# Patient Record
Sex: MALE | Race: WHITE
[De-identification: names, ages, dates, MRNs, and addresses within clinical notes are randomized per-mention and may not be internally consistent; named-entity substitution may affect disease eponyms.]

---

## 2019-12-05 ENCOUNTER — HOSPITAL ENCOUNTER (EMERGENCY)
Dept: HOSPITAL 5 - ED | Age: 79
LOS: 1 days | Discharge: OTHER INSTITUTION | End: 2019-12-06
Payer: MEDICARE

## 2019-12-05 VITALS — DIASTOLIC BLOOD PRESSURE: 60 MMHG | SYSTOLIC BLOOD PRESSURE: 140 MMHG

## 2019-12-05 DIAGNOSIS — S06.5X9A: Primary | ICD-10-CM

## 2019-12-05 DIAGNOSIS — S00.03XA: ICD-10-CM
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
## 2019-12-05 DIAGNOSIS — S00.83XA: ICD-10-CM
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
## 2019-12-05 DIAGNOSIS — S20.219A: ICD-10-CM
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
## 2019-12-05 DIAGNOSIS — S10.93XA: ICD-10-CM

## 2019-12-05 DIAGNOSIS — I10: ICD-10-CM
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
## 2019-12-05 DIAGNOSIS — M62.838: ICD-10-CM

## 2019-12-05 DIAGNOSIS — Z87.891: ICD-10-CM

## 2019-12-05 PROCEDURE — 72125 CT NECK SPINE W/O DYE: CPT

## 2019-12-05 PROCEDURE — 70450 CT HEAD/BRAIN W/O DYE: CPT

## 2019-12-05 PROCEDURE — 70486 CT MAXILLOFACIAL W/O DYE: CPT

## 2019-12-05 PROCEDURE — 71250 CT THORAX DX C-: CPT

## 2019-12-05 NOTE — CAT SCAN REPORT
CT facial bones wo con



INDICATION / CLINICAL INFORMATION:

78 years Male; fall with pain.



TECHNIQUE:

Thin cut axial images obtained to the facial bones. Sagittal and coronal reconstructions performed. A
ll CT scans at this location are performed using CT dose reduction for ALARA by means of automated ex
posure control. 



COMPARISON:

None available.



FINDINGS:

Significant subcutaneous soft tissue swelling is seen in the left facial region. No signs of underlyi
ng facial fracture appreciated.



Mild mucosal thickening seen in the ethmoids.



Poor dentition noted.



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



There is slight anterior subluxation of C1 with respect to C2, although the atlantodens interval is w
ithin normal limits. This finding is most likely on a developmental basis. There is mild narrowing of
 the thecal sac in this region, although no cervical medullary junction compression appreciated.



IMPRESSION:

1. No signs of acute bony facial trauma. 



Signer Name: Silvio Barnes MD, III 

Signed: 12/5/2019 8:39 PM

 Workstation Name: [id number]

## 2019-12-05 NOTE — CAT SCAN REPORT
CT head/brain wo con



INDICATION / CLINICAL INFORMATION:

78 years Male; fall with pain.



TECHNIQUE: Routine CT head without contrast. All CT scans at this location are performed using CT dos
e reduction for ALARA by means of automated exposure control.



COMPARISON: 

None.



FINDINGS:



BRAIN / INTRACRANIAL CONTENTS: There are findings concerning for a very small subdural hematoma along
 the left tentorium cerebelli. Short-term follow-up is recommended to ensure stability and/or resolut
ion of these findings.



Arachnoid cyst seen in the posterior fossa along the posterolateral margin of the right cerebellar he
misphere.



 Otherwise, no acute hemorrhage, mass effect, midline shift, hydrocephalus, or acute, large territori
al infarct.



Mild cerebral and cerebellar atrophy. 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
e.



There are mild areas of decreased attenuation in the white matter of the cerebral hemispheres. These 
are nonspecific findings and may be related to microangiopathy (hypertension, diabetes, atheroscleros
is), given the patient's age. It might be difficult to evaluate for small areas of ischemia without d
iffusion imaging by MRI.



CRANIOCERVICAL JUNCTION: No significant abnormality.



ORBITS: No significant abnormality of visualized orbits.

SINUSES / MASTOIDS: No significant abnormality the visualized paranasal sinuses or mastoid air cells.




ADDITIONAL FINDINGS: There may be slight anterior subluxation of C1 with respect to C2 as evidenced b
y the spinolaminar line. Follow-up with CT of the cervical spine, as clinically warranted.



Subcutaneous soft tissue swelling seen in the left periorbital region. No signs of underlying fractur
e appreciated.



  Atherosclerotic disease is seen in the anterior and posterior circulation. 



IMPRESSION:

1. There is suggestion of a very thin, acute, subdural hematoma along the left tentorium cerebelli. S
hort-term follow-up is recommended to ensure stability and/or resolution of these findings.

2. Otherwise, no focal intra-axial mass, acute hemorrhage, hydrocephalus, or large infarct seen. 



Signer Name: Silvio Barnes MD, III 

Signed: 12/5/2019 8:20 PM

 Workstation Name: [id number]

## 2019-12-05 NOTE — EMERGENCY DEPARTMENT REPORT
Blank Doc





- Documentation


Documentation: 





78-year-0ld male that presents with fall.  Headache, neck pain, and eye hematoma

noted





This initial assessment/diagnostic orders/clinical plan/treatment(s) is/are 

subject to change based on patient's health status, clinical progression and re-

assessment by fellow clinical providers in the ED.  Further treatment and workup

at subsequent clinical providers discretion.  Patient/guardians urged not to 

elope from the ED as their condition may be serious if not clinically assessed 

and managed.  Initial orders include:


1- Patient sent to MAIN ED for further evaluation and treatment


2- CT head/face/neck


3- cervical collar

## 2019-12-05 NOTE — CAT SCAN REPORT
CT cervical spine wo con



INDICATION / CLINICAL INFORMATION:

78 years Male; fall with pain.



TECHNIQUE:

Axial CT images of the cervical spine were obtained.  Sagittal and coronal reformatted images were pr
oduced. All CT scans at this location are performed using CT dose reduction for ALARA by means of aut
omated exposure control.



COMPARISON: 

None available.



FINDINGS:



POST-SURGICAL CHANGES: None.



ALIGNMENT: There is slight anterior subluxation of C1 with respect to C2, with respect to the spinola
minar line. However, the space between the anterior arch of C1 and the adjacent dens is within normal
 limits. This finding is presumably on a developmental basis.



Mild kyphosis of the cervical spine seen, which may be related to patient positioning on the table.



VERTEBRAE: No signs of fracture. Vertebral bodies are grossly normal in height throughout.  



There is osseous foraminal narrowing at virtually all levels related uncinate and facet hypertrophy-m
oderate in degree.



INTRAVERTEBRAL DISCS: Multilevel disc space narrowing seen. Mild disc disease seen at various levels.
 No significant canal stenosis appreciated.



PARASPINAL SOFT TISSUES: No significant abnormality.



ADDITIONAL FINDINGS: There is mild mucosal thickening in the mastoids bilaterally.



IMPRESSION:

1. No signs of acute bony trauma to the cervical spine.



Signer Name: Silvio Barnes MD, III 

Signed: 12/5/2019 8:43 PM

 Workstation Name: [id number]

## 2019-12-05 NOTE — EMERGENCY DEPARTMENT REPORT
ED Fall HPI





- General


Chief Complaint: Fall


Stated Complaint: CHEST PAIN/FALL INJURY/FACE SWELL


Time Seen by Provider: 19 18:56


Source: patient, family


Mode of arrival: Ambulatory





- History of Present Illness


Initial Comments: 





Perform ED, patient is a 78-year-old  American male with a history of 

hypertension and hyperlipidemia who presents to the ED with complaint of left 

facial swelling, headache and chest wall pain after he rolled off the bed and 

fell on his face about 2 hours ago.  Family states that the patient landed on 

his face and now presents to the ED with a swollen severely painful left facial 

area.  Family also states that the patient did not lose consciousness, has not 

had any nausea, vomiting, shortness of breath, dizziness, lightheadedness, mcqueen

ge in vision, change in speech, back pain or numbness and tingling or weakness 

of upper and lower extremities bilaterally, seizures or confusion.


MD Complaint: fall, other (facial swelling; headache, chest wall  pain)


-: Sudden, hour(s) (2)


Fall From: out of bed


When Fall Occurred: 1-3 hours PTA


Fall Witnessed: yes, by family


Place Fall Occurred: home


Loss of Consciousness: second(s)


Prolonged Down Time?: no


Symptoms Prior to Fall: none


Location: head, face, chest


Severity: severe


Severity scale (0 -10): 7


Quality: sharp, aching


Context: other (Patient was asleep on bed when he rolled off the bed and landed 

on face)


Associated Symptoms: denies, headache, neck pain, chest paint.  denies: 

numbness, weakness, shortness of breath, abdominal pain, hematuria, unable to 

walk, lightheaded, vertigo, confusion, other





- Related Data


                                    Allergies











Allergy/AdvReac Type Severity Reaction Status Date / Time


 


No Known Allergies Allergy   Unverified 19 17:53














ED Review of Systems


ROS: 


Stated complaint: CHEST PAIN/FALL INJURY/FACE SWELL


Other details as noted in HPI





Constitutional: denies: chills, fever


Eyes: denies: eye pain, eye discharge, vision change


ENT: other (left facial swelling and pain).  denies: ear pain, throat pain


Respiratory: denies: cough, shortness of breath, SOB with exertion, SOB at rest,

wheezing


Cardiovascular: chest pain (diffuse chest wall pain).  denies: palpitations, 

syncope, paroxysmal nocturnal dyspnea


Endocrine: no symptoms reported


Gastrointestinal: denies: abdominal pain, nausea, vomiting, diarrhea


Genitourinary: denies: urgency, dysuria


Musculoskeletal: denies: back pain, joint swelling, arthralgia


Skin: denies: rash, lesions


Neurological: headache.  denies: weakness, numbness, paresthesias, abnormal 

gait, vertigo


Psychiatric: denies: anxiety, depression


Hematological/Lymphatic: denies: easy bleeding, easy bruising





ED Past Medical Hx





- Past Medical History


Previous Medical History?: Yes


Hx Hypertension: Yes





- Surgical History


Past Surgical History?: No





- Social History


Smoking Status: Former Smoker


Substance Use Type: None





ED Physical Exam





- General


Limitations: Language Barrier


General appearance: alert, in no apparent distress





- Head


Head exam: Present: other (swollen, severely tender left zygomatic area with 

large hematoma)





- Eye


Eye exam: Present: normal appearance, PERRL, EOMI, periorbital swelling (left), 

periorbital tenderness (left)


Pupils: Present: normal accommodation





- ENT


ENT exam: Present: normal exam, normal orophraynx, mucous membranes moist, TM's 

normal bilaterally, normal external ear exam, other (swollen, severely tender 

left zygomatic area)





- Neck


Neck exam: Present: normal inspection, tenderness (palpable cervical paraspinal 

musculoskeletal tenderness), full ROM





- Respiratory


Respiratory exam: Present: normal lung sounds bilaterally, chest wall tenderness

(palpable chest wall tenderness).  Absent: respiratory distress, wheezes, rales,

accessory muscle use





- Cardiovascular


Cardiovascular Exam: Present: regular rate, normal rhythm, normal heart sounds. 

Absent: systolic murmur, diastolic murmur, rubs, gallop





- GI/Abdominal


GI/Abdominal exam: Present: soft, normal bowel sounds.  Absent: tenderness, 

guarding, hyperactive bowel sounds, hypoactive bowel sounds, organomegaly





- Extremities Exam


Extremities exam: Present: normal inspection, full ROM, normal capillary refill





- Back Exam


Back exam: Present: normal inspection, full ROM.  Absent: tenderness, CVA 

tenderness (R), CVA tenderness (L), muscle spasm, paraspinal tenderness





- Neurological Exam


Neurological exam: Present: alert, oriented X3, CN II-XII intact, normal gait, 

reflexes normal





- Psychiatric


Psychiatric exam: Present: normal affect, normal mood





- Skin


Skin exam: Present: warm, dry, intact, normal color.  Absent: rash





ED Course





                                   Vital Signs











  19





  17:57 20:21 21:28


 


Temperature 98.3 F  99.0 F


 


Pulse Rate 87  76


 


Respiratory 16 18 18





Rate   


 


Blood Pressure 165/72  


 


Blood Pressure   140/60





[Left]   


 


O2 Sat by Pulse 92  96





Oximetry   














- Reevaluation(s)


Reevaluation #1: 





19 2045:02


Patient was treated for pain in the ED, head CT scan without contrast, C-spine 

CT scan without contrast, facial bone CT scan without contrast and chest CT scan

 without contrast were all ordered.





ED Medical Decision Making





- Radiology Data


Radiology results: report reviewed, image reviewed








Findings


[facility]


[address] [place] [address]





Cat Scan Report


Signed





Patient: TANNER SHEEHAN MR#: [id number]





: 1940 Acct:[id number]





Age/Sex: 78 / M ADM Date: 19





Loc: ED


Attending Dr:








Ordering Physician: ZOE DESAI


Date of Service: 19


Procedure(s): CT chest wo con


Accession Number(s): [id number]





cc: ZOE DESAI








CT chest wo con





INDICATION:


Fall - pain.





TECHNIQUE:


All CT scans at this location are performed using the following dose modulation 

technique:


Automated exposure control. Helical slices were obtained through the chest. No 

contrast is


administered.





COMPARISON:


None available.





FINDINGS:


Chest: There is no pneumothorax. There is some minimal linear scar or 

atelectatic change in the


left upper lobe adjacent to the major fissure. The heart size is normal. The 

aorta is normal in


diameter. No mediastinal, seen.





No acute abnormality is seen in the upper abdomen. There is a cyst in the left 

lobe liver.





On review of bone windows, no fracture is seen.





IMPRESSION:


1. No acute abnormality is seen in the chest. There is no pneumothorax. No 

mediastinal hematoma is


seen.





Signer Name: Miguel Spence MD


Signed: 2019 8:29 PM


Workstation Name: [id number]








Transcribed By: 


Dictated By: Miguel Spence MD


Electronically Authenticated By: Miguel Spence MD


Signed Date/Time: 19








 

--------------------------------------------------------------------------------


----------------------------------------------





Findings


[facility]


[address] [place] [address]





Cat Scan Report


Signed





Patient: TANNER SHEEHAN MR#: [id number]





: 1940 Acct:[id number]





Age/Sex: 78 / M ADM Date: 19





Loc: ED


Attending Dr:








Ordering Physician: MOON LEONE NP


Date of Service: 19


Procedure(s): CT head/brain wo con


Accession Number(s): [id number]





cc: MONO LEONE NP








CT head/brain wo con





INDICATION / CLINICAL INFORMATION:


78 years Male; fall with pain.





TECHNIQUE: Routine CT head without contrast. All CT scans at this location are 

performed using CT


dose reduction for ALARA by means of automated exposure control.





COMPARISON:


None.





FINDINGS:





BRAIN / INTRACRANIAL CONTENTS: There are findings concerning for a very small 

subdural hematoma


along the left tentorium cerebelli. Short-term follow-up is recommended to 

ensure stability and/or


resolution of these findings.





Arachnoid cyst seen in the posterior fossa along the posterolateral margin of 

the right cerebellar


hemisphere.





Otherwise, no acute hemorrhage, mass effect, midline shift, hydrocephalus, or 

acute, large


territorial infarct.





Mild cerebral and cerebellar atrophy. Old lacunar type infarcts seen near the 

head of the left


caudate.





There are mild areas of decreased attenuation in the white matter of the 

cerebral hemispheres.


These are nonspecific findings and may be related to microangiopathy 

(hypertension, diabetes,


atherosclerosis), given the patient's age. It might be difficult to evaluate for

 small areas of


ischemia without diffusion imaging by MRI.





CRANIOCERVICAL JUNCTION: No significant abnormality.





ORBITS: No significant abnormality of visualized orbits.


SINUSES / MASTOIDS: No significant abnormality the visualized paranasal sinuses 

or mastoid air


cells.





ADDITIONAL FINDINGS: There may be slight anterior subluxation of C1 with respect

 to C2 as evidenced


by the spinolaminar line. Follow-up with CT of the cervical spine, as clinically

 warranted.





Subcutaneous soft tissue swelling seen in the left periorbital region. No signs 

of underlying


fracture appreciated.





Atherosclerotic disease is seen in the anterior and posterior circulation.





IMPRESSION:


1. There is suggestion of a very thin, acute, subdural hematoma along the left 

tentorium cerebelli.


Short-term follow-up is recommended to ensure stability and/or resolution of 

these findings.


2. Otherwise, no focal intra-axial mass, acute hemorrhage, hydrocephalus, or 

large infarct seen.





Signer Name: Silvio Barnes MD, III


Signed: 2019 8:20 PM


Workstation Name: [id number]








Transcribed By: HR


Dictated By: Silvio Barnes MD


Electronically Authenticated By: Silvio Barnes MD


Signed Date/Time: 19











DD/DT: 19





------------------------------------------------

--------------------------------------------------------------------------------


-





Findings


[facility]


[address] [place] [address]





Cat Scan Report


Signed





Patient: TANNER SHEEHAN MR#: [id number]





: 1940 Acct:[id number]





Age/Sex: 78 / M ADM Date: 19





Loc: ED


Attending Dr:








Ordering Physician: MOON LEONE NP


Date of Service: 19


Procedure(s): CT facial bones wo con


Accession Number(s): [id number]





cc: MOON LEONE NP








CT facial bones wo con





INDICATION / CLINICAL INFORMATION:


78 years Male; fall with pain.





TECHNIQUE:


Thin cut axial images obtained to the facial bones. Sagittal and coronal 

reconstructions performed.


All CT scans at this location are performed using CT dose reduction for ALARA by

 means of automated


exposure control.





COMPARISON:


None available.





FINDINGS:


Significant subcutaneous soft tissue swelling is seen in the left facial region.

 No signs of


underlying facial fracture appreciated.





Mild mucosal thickening seen in the ethmoids.





Poor dentition noted.





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





There is slight anterior subluxation of C1 with respect to C2, although the 

atlantodens interval is


within normal limits. This finding is most likely on a developmental basis. 

There is mild narrowing


of the thecal sac in this region, although no cervical medullary junction 

compression appreciated.





IMPRESSION:


1. No signs of acute bony facial trauma.





Signer Name: Silvio Barnes MD, III


Signed: 2019 8:39 PM


Workstation Name: [id number]








Transcribed By: HR


Dictated By: Silvio Barnes MD


Electronically Authenticated By: Silvio Barnes MD


Signed Date/Time: 19











DD/DT: 19


TD/TT:





----------------------------------------------------------------------

---------------------------------------------------------------





Findings


[facility]


[address] [place] [address]





Cat Scan Report


Signed





Patient: TANNER SHEEHAN MR#: [id number]





: 1940 Acct:[id number]





Age/Sex: 78 / M ADM Date: 19





Loc: ED


Attending Dr:








Ordering Physician: MOON LEONE NP


Date of Service: 19


Procedure(s): CT cervical spine wo con


Accession Number(s): [id number]





cc: MOON LEONE NP








CT cervical spine wo con





INDICATION / CLINICAL INFORMATION:


78 years Male; fall with pain.





TECHNIQUE:


Axial CT images of the cervical spine were obtained. Sagittal and coronal 

reformatted images were


produced. All CT scans at this location are performed using CT dose reduction 

for ALARA by means of


automated exposure control.





COMPARISON:


None available.





FINDINGS:





POST-SURGICAL CHANGES: None.





ALIGNMENT: There is slight anterior subluxation of C1 with respect to C2, with 

respect to the


spinolaminar line. However, the space between the anterior arch of C1 and the 

adjacent dens is


within normal limits. This finding is presumably on a developmental basis.





Mild kyphosis of the cervical spine seen, which may be related to patient 

positioning on the table.





VERTEBRAE: No signs of fracture. Vertebral bodies are grossly normal in height 

throughout.





There is osseous foraminal narrowing at virtually all levels related uncinate 

and facet


hypertrophy-moderate in degree.





INTRAVERTEBRAL DISCS: Multilevel disc space narrowing seen. Mild disc disease 

seen at various


levels. No significant canal stenosis appreciated.





PARASPINAL SOFT TISSUES: No significant abnormality.





ADDITIONAL FINDINGS: There is mild mucosal thickening in the mastoids 

bilaterally.





IMPRESSION:


1. No signs of acute bony trauma to the cervical spine.





Signer Name: Silvio Barnes MD, III


Signed: 2019 8:43 PM


Workstation Name: [id number]








Transcribed By: HR


Dictated By: Silvio Barnes MD


Electronically Authenticated By: Silvio Barnes MD


Signed Date/Time: 19











DD/DT: 19


TD/TT:








- Medical Decision Making





This is a 78 year-old male who presented to the ED with headache, left facial sw

elling, and chest pain for the last 2 hours after he fell off a bed and landed 

on his face on the floor.  In the ED, patient is alert and oriented 3 and is 

not in distress.  Patient was treated for pain with Tylenol.  The C-spine CT 

scan without contrast shows no signs of acute bony trauma to the cervical spine.

  The facial bone CT scan without contrast shows a slight anterior subluxation 

of C1 with respect to C2, although the atlantodens interval is within normal 

limits. This finding is most likely on a developmental basis. There is mild 

narrowing


of the thecal sac in this region, although no cervical medullary junction 

compression appreciated.  There is otherwise no facial bone fractures.  Patient 

had CT scan without contrast shows a suggestion of a very thin, acute, subdural 

hematoma along the left tentorium cerebelli. Short-term follow-up is recommended

 to ensure stability and/or resolution of these findings.  There is however no 

focal intra-axial mass, acute hemorrhage, hydrocephalus, or large infarct seen. 

The Chest CT scan w/o contrast shows no acute abnormality is seen in the chest. 

There is no pneumothorax. No mediastinal hematoma is


seen. 





I discussed these findings with Dr. Macdonald, the ED attending physician who advised

 that the [facility]  Transfer center be paged and the patient be 

transferred.  The [facility] transfer center was paged and discussed the patient's 

case with the Trauma Attending Physician Dr. Hunter who accepted the patient 

to [facility].  Patient was therefore transferred to [facility].








- Differential Diagnosis


Subdural hematoma; facial bone fracture; head injury; rib fracture


Critical care attestation.: 


If time is entered above; I have spent that time in minutes in the direct care 

of this critically ill patient, excluding procedure time.








ED Disposition


Clinical Impression: 


 Acute subdural hematoma, Cervical paraspinal muscle spasm





Contusion of face, scalp and neck


Qualifiers:


 Encounter type: initial encounter Qualified Code(s): S00.83XA - Contusion of 

other part of head, initial encounter; S00.03XA - Contusion of scalp, initial 

encounter; S10.93XA - Contusion of unspecified part of neck, initial encounter





Contusion of chest wall


Qualifiers:


 Encounter type: initial encounter Laterality: unspecified laterality Qualified 

Code(s): S20.219A - Contusion of unspecified front wall of thorax, initial 

encounter





Disposition: DC/TX-70 ANOTHER TYPE HLTHCARE


Is pt being admited?: No


Does the pt Need Aspirin: No


Condition: Stable


Instructions:  Scalp Contusion in Adults (ED), Subdural Hematoma (ED)


Referrals: 


PRIMARY CARE,MD [Primary Care Provider] - 3-5 Days


Time of Disposition: 00:13


Print Language: ENGLISH

## 2019-12-05 NOTE — CAT SCAN REPORT
CT chest wo con



INDICATION:

Fall - pain.



TECHNIQUE:

All CT scans at this location are performed using the following dose modulation technique: Automated 
exposure control. Helical slices were obtained through the chest. No contrast is administered. 



COMPARISON:

None available.



FINDINGS:

Chest: There is no pneumothorax. There is some minimal linear scar or atelectatic change in the left 
upper lobe adjacent to the major fissure. The heart size is normal. The aorta is normal in diameter. 
No mediastinal, seen.



No acute abnormality is seen in the upper abdomen. There is a cyst in the left lobe liver.



On review of bone windows, no fracture is seen.



IMPRESSION:

1. No acute abnormality is seen in the chest. There is no pneumothorax. No mediastinal hematoma is se
en.



Signer Name: Miguel Spence MD 

Signed: 12/5/2019 8:29 PM

 Workstation Name: [id number]

## 2020-05-27 PROBLEM — 267434003: Status: ACTIVE | Noted: 2020-05-27

## 2020-05-27 PROBLEM — 128302006: Status: ACTIVE | Noted: 2020-05-27

## 2020-05-27 PROBLEM — 59621000: Status: ACTIVE | Noted: 2020-05-27

## 2020-05-27 PROBLEM — 275978004 SCREENING FOR MALIGNANT NEOPLASM OF COLON: Status: ACTIVE | Noted: 2020-05-27

## 2020-06-02 ENCOUNTER — OFFICE VISIT (OUTPATIENT)
Dept: URBAN - METROPOLITAN AREA CLINIC 37 | Facility: CLINIC | Age: 80
End: 2020-06-02

## 2020-06-02 RX ORDER — BENAZEPRIL HYDROCHLORIDE 40 MG/1
1 TABLET TABLET ORAL ONCE A DAY
Qty: 30 | Status: ACTIVE | COMMUNITY

## 2020-06-02 RX ORDER — ATORVASTATIN CALCIUM 10 MG/1
1/2  TABLET TABLET, FILM COATED ORAL ONCE A DAY
Status: ACTIVE | COMMUNITY

## 2020-06-02 RX ORDER — AMLODIPINE BESYLATE 10 MG/1
1 TABLET TABLET ORAL ONCE A DAY
Qty: 30 | Status: ACTIVE | COMMUNITY

## 2020-06-02 RX ORDER — FENOFIBRATE 134 MG/1
1 CAPSULE WITH A MEAL CAPSULE ORAL ONCE A DAY
Qty: 30 | Status: ACTIVE | COMMUNITY

## 2020-06-02 RX ORDER — SODIUM SULFATE, POTASSIUM SULFATE, MAGNESIUM SULFATE 17.5; 3.13; 1.6 G/ML; G/ML; G/ML
AS DIRECTED SOLUTION, CONCENTRATE ORAL ONCE
Qty: 1 KIT | Refills: 0 | Status: ACTIVE | COMMUNITY
Start: 2020-05-27

## 2020-06-02 RX ORDER — ATENOLOL 50 MG/1
1 TABLET TABLET ORAL ONCE A DAY
Qty: 30 | Status: ACTIVE | COMMUNITY

## 2020-06-03 ENCOUNTER — LAB OUTSIDE AN ENCOUNTER (OUTPATIENT)
Dept: URBAN - METROPOLITAN AREA CLINIC 37 | Facility: CLINIC | Age: 80
End: 2020-06-03

## 2020-06-04 ENCOUNTER — OFFICE VISIT (OUTPATIENT)
Dept: URBAN - METROPOLITAN AREA CLINIC 35 | Facility: CLINIC | Age: 80
End: 2020-06-04

## 2020-06-04 RX ORDER — BENAZEPRIL HYDROCHLORIDE 40 MG/1
1 TABLET TABLET ORAL ONCE A DAY
Qty: 30 | Status: ACTIVE | COMMUNITY

## 2020-06-04 RX ORDER — SODIUM SULFATE, POTASSIUM SULFATE, MAGNESIUM SULFATE 17.5; 3.13; 1.6 G/ML; G/ML; G/ML
AS DIRECTED SOLUTION, CONCENTRATE ORAL ONCE
Qty: 1 KIT | Refills: 0 | Status: ACTIVE | COMMUNITY
Start: 2020-05-27

## 2020-06-04 RX ORDER — FENOFIBRATE 134 MG/1
1 CAPSULE WITH A MEAL CAPSULE ORAL ONCE A DAY
Qty: 30 | Status: ACTIVE | COMMUNITY

## 2020-06-04 RX ORDER — ATORVASTATIN CALCIUM 10 MG/1
1/2  TABLET TABLET, FILM COATED ORAL ONCE A DAY
Status: ACTIVE | COMMUNITY

## 2020-06-04 RX ORDER — ATENOLOL 50 MG/1
1 TABLET TABLET ORAL ONCE A DAY
Qty: 30 | Status: ACTIVE | COMMUNITY

## 2020-06-04 RX ORDER — AMLODIPINE BESYLATE 10 MG/1
1 TABLET TABLET ORAL ONCE A DAY
Qty: 30 | Status: ACTIVE | COMMUNITY

## 2020-06-05 ENCOUNTER — TELEPHONE ENCOUNTER (OUTPATIENT)
Dept: URBAN - METROPOLITAN AREA CLINIC 35 | Facility: CLINIC | Age: 80
End: 2020-06-05

## 2020-06-09 ENCOUNTER — OFFICE VISIT (OUTPATIENT)
Dept: URBAN - METROPOLITAN AREA SURGERY CENTER 8 | Facility: SURGERY CENTER | Age: 80
End: 2020-06-09

## 2020-06-10 ENCOUNTER — TELEPHONE ENCOUNTER (OUTPATIENT)
Dept: URBAN - METROPOLITAN AREA CLINIC 35 | Facility: CLINIC | Age: 80
End: 2020-06-10

## 2020-06-24 ENCOUNTER — OFFICE VISIT (OUTPATIENT)
Dept: URBAN - METROPOLITAN AREA CLINIC 37 | Facility: CLINIC | Age: 80
End: 2020-06-24

## 2020-06-24 VITALS — WEIGHT: 122 LBS | BODY MASS INDEX: 21.62 KG/M2 | HEIGHT: 63 IN

## 2020-06-24 RX ORDER — ATORVASTATIN CALCIUM 10 MG/1
1/2  TABLET TABLET, FILM COATED ORAL ONCE A DAY
Status: ACTIVE | COMMUNITY

## 2020-06-24 RX ORDER — BENAZEPRIL HYDROCHLORIDE 40 MG/1
1 TABLET TABLET ORAL ONCE A DAY
Qty: 30 | Status: ACTIVE | COMMUNITY

## 2020-06-24 RX ORDER — FENOFIBRATE 134 MG/1
1 CAPSULE WITH A MEAL CAPSULE ORAL ONCE A DAY
Qty: 30 | Status: ACTIVE | COMMUNITY

## 2020-06-24 RX ORDER — DOCUSATE SODIUM 100 MG/1
2 CAPSULES CAPSULE ORAL ONCE A DAY
Qty: 60 CAPSULE | Refills: 5 | OUTPATIENT
Start: 2020-06-24

## 2020-06-24 RX ORDER — AMLODIPINE BESYLATE 10 MG/1
1 TABLET TABLET ORAL ONCE A DAY
Qty: 30 | Status: ACTIVE | COMMUNITY

## 2020-06-24 RX ORDER — ATENOLOL 50 MG/1
1 TABLET TABLET ORAL ONCE A DAY
Qty: 30 | Status: ACTIVE | COMMUNITY

## 2020-06-24 NOTE — HPI-MIGRATED HPI
Hep C : Family h/o liver or GI -> No;   Hep C : Last OV was -> 1 month ago;   Hep C : Patient is here for -> follow up on Hep C, GT;   Hep C : Time of diagnosis -> in 2006 on routine labs, he was treated with 6 months injections Was told he was cured;   Hep C : Patient denies: -> Fever, Chills, Night sweats, Loss of appetite,  weight loss, jaundice, ascites edema, variceal bleeding, or encephalopathy;   Hep C : Prior Imaging studies showed -> He had liver bxx in 2006 , was told his liver was normal;   Hep C : H/o cirrhosis -> Denies;   Hep C : Previous risk factors: -> Unknown;   Interim investigations : Labs done on: ->  * 06/02/2020, see below * 10/29/2019, ordered by PCP: - CBC with: WBC:8.4; RBC:5.24; Hgb:15.6; Hct:48.6; Plt:296 - CMP: Glucose: 108 (H); BUN: 20; Cr: 1.36 (H); Na: 141; K: 4.5; Alb: 4.8; ALT:20; AST:20; ALP: 65; Tbili: 0.6 * 06/16/2010, ordered by PCP: - CMP: Glucose: 92; BUN: 14; Cr: 1.01; Na: 139; K: 4.0; Alb: 4.4; ALT: 26; AST: 23; ALP: 82; Tbili: 0.5 - HAV Ab, IgM: negative - HBsAg screen: negative - HBcAb, IgM: negative - HCVAb: > 11.0 (H);   Interim investigations : Imaging studies: ->  * US Abd on 06/02/2020: One large cyst in the right lobe of the liver. No focal hepatic lesion or mass. No GS. Otherwise, normal US abdomen;   Post-op OV : After Colonoscopy on -> 06/09/2020, at which time two small polyps were detected and resected. Histology showed they was a tubular adenoma and a benign mucosa prolapse-type polyp. Non- bleeding grade II internal hemorrhoids were found during retroflexion but not banded. Good quality bowel prep but patient had a mildly tortuous colon;   Post-op OV : Patient denies -> rectal bleeding, fever, nausea & vomiting since the procedure date;   Post-op OV : Patient -> denies any new changes in his/her health status since last OV. Current BM: one BM every 2 days. Stool was soft to hard. Denies rectal bleeding ;

## 2020-07-02 ENCOUNTER — TELEPHONE ENCOUNTER (OUTPATIENT)
Dept: URBAN - METROPOLITAN AREA CLINIC 35 | Facility: CLINIC | Age: 80
End: 2020-07-02

## 2020-07-04 PROBLEM — 721704005: Status: ACTIVE | Noted: 2020-06-24

## 2020-07-04 PROBLEM — 92318000: Status: ACTIVE | Noted: 2020-06-24

## 2020-07-04 PROBLEM — 92076000: Status: ACTIVE | Noted: 2020-06-24

## 2020-07-13 ENCOUNTER — LAB OUTSIDE AN ENCOUNTER (OUTPATIENT)
Dept: URBAN - METROPOLITAN AREA CLINIC 37 | Facility: CLINIC | Age: 80
End: 2020-07-13

## 2020-07-14 LAB
% SATURATION: 12
ABSOLUTE BASOPHILS: 57
ABSOLUTE EOSINOPHILS: 162
ABSOLUTE LYMPHOCYTES: 3151
ABSOLUTE MONOCYTES: 867
ABSOLUTE NEUTROPHILS: 3864
BASOPHILS: 0.7
EOSINOPHILS: 2
FERRITIN: 15
HEMATOCRIT: 42.5
HEMOGLOBIN: 13.1
IRON BINDING CAPACITY: 420
IRON, TOTAL: 49
LYMPHOCYTES: 38.9
MCH: 26.2
MCHC: 30.8
MCV: 85
MONOCYTES: 10.7
MPV: 11.3
NEUTROPHILS: 47.7
PLATELET COUNT: 313
RDW: 13.3
RED BLOOD CELL COUNT: 5
WHITE BLOOD CELL COUNT: 8.1

## 2020-07-30 ENCOUNTER — OFFICE VISIT (OUTPATIENT)
Dept: URBAN - METROPOLITAN AREA CLINIC 37 | Facility: CLINIC | Age: 80
End: 2020-07-30

## 2020-07-30 VITALS — HEIGHT: 63 IN | BODY MASS INDEX: 23.04 KG/M2 | WEIGHT: 130 LBS

## 2020-07-30 RX ORDER — FENOFIBRATE 134 MG/1
1 CAPSULE WITH A MEAL CAPSULE ORAL ONCE A DAY
Qty: 30 | Status: ACTIVE | COMMUNITY

## 2020-07-30 RX ORDER — AMLODIPINE BESYLATE 10 MG/1
1 TABLET TABLET ORAL ONCE A DAY
Qty: 30 | Status: ACTIVE | COMMUNITY

## 2020-07-30 RX ORDER — BENAZEPRIL HYDROCHLORIDE 40 MG/1
1 TABLET TABLET ORAL ONCE A DAY
Qty: 30 | Status: ACTIVE | COMMUNITY

## 2020-07-30 RX ORDER — DOCUSATE SODIUM 100 MG/1
2 CAPSULES CAPSULE ORAL ONCE A DAY
Qty: 60 CAPSULE | Refills: 5 | OUTPATIENT
Start: 2020-07-30

## 2020-07-30 RX ORDER — DOCUSATE SODIUM 100 MG/1
2 CAPSULES CAPSULE ORAL ONCE A DAY
Qty: 60 CAPSULE | Refills: 5 | Status: ACTIVE | COMMUNITY
Start: 2020-06-24

## 2020-07-30 RX ORDER — ATORVASTATIN CALCIUM 10 MG/1
1/2  TABLET TABLET, FILM COATED ORAL ONCE A DAY
Status: ACTIVE | COMMUNITY

## 2020-07-30 RX ORDER — FERROUS SULFATE TAB EC 325 MG (65 MG FE EQUIVALENT) 325 (65 FE) MG
1 TABLET TABLET DELAYED RESPONSE ORAL ONCE A DAY
Qty: 30 | Refills: 4 | OUTPATIENT
Start: 2020-07-30

## 2020-07-30 RX ORDER — ATENOLOL 50 MG/1
1 TABLET TABLET ORAL ONCE A DAY
Qty: 30 | Status: ACTIVE | COMMUNITY

## 2020-07-30 NOTE — HPI-MIGRATED HPI
Interim investigations : Labs done on: ->  * 07/13/2020, see below;   Interim investigations : Imaging studies: ->  *US Abd on 06/02/2020: One large cyst in the right lobe of the liver. No focal hepatic lesion or mass. No GS. Otherwise, normal US abdomen.;   Follow up OV : Patient is here for a routine OV for -> Iron Deficiency Anemia and Constipation;   Follow up OV : Last OV was -> 1 month ago * Anemia:  Patient is not on Fe supplement currently but will discuss repeat labs today to determine if treatment is necessary  * Constipation:  Patient was prescribed Colace 200 mg daily last OV Current BM: one BM every day or every other ;

## 2020-10-30 ENCOUNTER — LAB OUTSIDE AN ENCOUNTER (OUTPATIENT)
Dept: URBAN - METROPOLITAN AREA CLINIC 37 | Facility: CLINIC | Age: 80
End: 2020-10-30

## 2020-10-31 LAB
% SATURATION: 44
ABSOLUTE BASOPHILS: 63
ABSOLUTE EOSINOPHILS: 147
ABSOLUTE LYMPHOCYTES: 2996
ABSOLUTE MONOCYTES: 630
ABSOLUTE NEUTROPHILS: 3164
BASOPHILS: 0.9
EOSINOPHILS: 2.1
FERRITIN: 126
HEMATOCRIT: 46.3
HEMOGLOBIN: 15.1
IRON BINDING CAPACITY: 318
IRON, TOTAL: 141
LYMPHOCYTES: 42.8
MCH: 28.2
MCHC: 32.6
MCV: 86.5
MONOCYTES: 9
MPV: 11
NEUTROPHILS: 45.2
PLATELET COUNT: 267
RDW: 18.9
RED BLOOD CELL COUNT: 5.35
WHITE BLOOD CELL COUNT: 7

## 2020-11-13 ENCOUNTER — DASHBOARD ENCOUNTERS (OUTPATIENT)
Age: 80
End: 2020-11-13

## 2020-11-17 ENCOUNTER — OFFICE VISIT (OUTPATIENT)
Dept: URBAN - METROPOLITAN AREA CLINIC 37 | Facility: CLINIC | Age: 80
End: 2020-11-17

## 2020-11-17 VITALS — HEIGHT: 63 IN

## 2020-11-17 PROBLEM — 93871000119101: Status: ACTIVE | Noted: 2020-05-27

## 2020-11-17 PROBLEM — 14760008: Status: ACTIVE | Noted: 2020-06-24

## 2020-11-17 PROBLEM — 271737000: Status: ACTIVE | Noted: 2020-06-24

## 2020-11-17 PROBLEM — 87522002: Status: ACTIVE | Noted: 2020-07-30

## 2020-11-17 PROBLEM — 428283002: Status: ACTIVE | Noted: 2020-07-30

## 2020-11-17 RX ORDER — AMLODIPINE BESYLATE 10 MG/1
1 TABLET TABLET ORAL ONCE A DAY
Qty: 30 | COMMUNITY

## 2020-11-17 RX ORDER — DOCUSATE SODIUM 100 MG/1
2 CAPSULES CAPSULE ORAL ONCE A DAY
Qty: 60 CAPSULE | Refills: 5 | COMMUNITY
Start: 2020-06-24

## 2020-11-17 RX ORDER — DOCUSATE SODIUM 100 MG/1
2 CAPSULES CAPSULE ORAL ONCE A DAY
Qty: 60 | Refills: 5 | OUTPATIENT

## 2020-11-17 RX ORDER — FENOFIBRATE 134 MG/1
1 CAPSULE WITH A MEAL CAPSULE ORAL ONCE A DAY
Qty: 30 | COMMUNITY

## 2020-11-17 RX ORDER — FERROUS SULFATE TAB EC 325 MG (65 MG FE EQUIVALENT) 325 (65 FE) MG
1 TABLET TABLET DELAYED RESPONSE ORAL ONCE A DAY
Qty: 30 | Refills: 4 | COMMUNITY
Start: 2020-07-30

## 2020-11-17 RX ORDER — ATORVASTATIN CALCIUM 10 MG/1
1/2  TABLET TABLET, FILM COATED ORAL ONCE A DAY
COMMUNITY

## 2020-11-17 RX ORDER — BENAZEPRIL HYDROCHLORIDE 40 MG/1
1 TABLET TABLET ORAL ONCE A DAY
Qty: 30 | COMMUNITY

## 2020-11-17 RX ORDER — ATENOLOL 50 MG/1
1 TABLET TABLET ORAL ONCE A DAY
Qty: 30 | COMMUNITY

## 2020-11-17 RX ORDER — FERROUS SULFATE TAB EC 325 MG (65 MG FE EQUIVALENT) 325 (65 FE) MG
1 TABLET TABLET DELAYED RESPONSE ORAL ONCE A DAY
Qty: 30 | Refills: 4 | OUTPATIENT

## 2020-11-17 NOTE — HPI-MIGRATED HPI
Interim investigations : Labs done on: ->  *10/30/2020, see below *07/13/2020, see below;   Interim investigations : Imaging studies: ->  *US Abd on 06/02/2020: One large cyst in the right lobe of the liver. No focal hepatic lesion or mass. No GS. Otherwise, normal US abdomen.;   Follow up OV : Last OV was -> 4 months ago * Anemia:  Patient was prescribed with Ferrous 325 (65Fe) mg daily last visit.  If still have anemia, consider EGD to evaluate for UGI pathology.   * Constipation:  Patient was told to continue taking Colace 200 mg daily.  Current BM: one BM daily;   Follow up OV : Patient is here for a routine OV for -> Iron Deficiency Anemia and Constipation;